# Patient Record
Sex: MALE | Race: WHITE | NOT HISPANIC OR LATINO
[De-identification: names, ages, dates, MRNs, and addresses within clinical notes are randomized per-mention and may not be internally consistent; named-entity substitution may affect disease eponyms.]

---

## 2024-05-31 ENCOUNTER — NON-APPOINTMENT (OUTPATIENT)
Age: 84
End: 2024-05-31

## 2024-06-11 ENCOUNTER — APPOINTMENT (OUTPATIENT)
Dept: ORTHOPEDIC SURGERY | Facility: CLINIC | Age: 84
End: 2024-06-11
Payer: MEDICARE

## 2024-06-11 PROBLEM — Z00.00 ENCOUNTER FOR PREVENTIVE HEALTH EXAMINATION: Status: ACTIVE | Noted: 2024-06-11

## 2024-06-11 PROCEDURE — 99203 OFFICE O/P NEW LOW 30 MIN: CPT

## 2024-06-11 NOTE — PHYSICAL EXAM
[de-identified] : Right knee there is definite global warmth and soft tissue swelling but no effusion there is mild medial joint line tenderness range of motion 0 to 125 degrees mild varus inclination the knee is stable to stress varus valgus stress both full tension and 90 degrees of flexion. [de-identified] : Patient had recent outside radiographs available for review AP standing individual lateral sunrise views were reviewed indicating mild medial joint space narrowing of the medial compartment the right knee on standing AP projection.  Patellofemoral articulation is fairly well-maintained.

## 2024-06-11 NOTE — DISCUSSION/SUMMARY
[de-identified] : Patient I reviewed the radiographs directly he was able to appreciate the mild narrowing of medial joint space on standing AP projection of the right knee compared to the left.  We talked about further conservative measures can be undertaken to help reduce his discomfort patient was given a cortisone injection today and also told to take Tylenol extra strength when needed for pain he should also ice the knee on a regular basis.  Patient will follow-up as needed if pain persist despite these conservative measures.  Today's consultation lasted 35 minutes.

## 2024-06-11 NOTE — PROCEDURE
[de-identified] : [facility] [place] [id number] LOT # [id number] EXP 7/25 1% 500MG/50ML  KENALOG-10 [employer] NDC [id number] LOT # [id number] EXP 12/25 50MG/5ML  Patient with cortisone injection lateral part of the right knee today.  This done in sterile conditions.  Patient tolerated injection well.

## 2024-06-11 NOTE — HISTORY OF PRESENT ILLNESS
[de-identified] : First-time visit for this active 84-year-old gentleman he is here with approximate 9-month history of right medial knee pain.  Patient states he is very active and is use played a lot of sports recalls no specific recent accident injury or initiating traumatic event has been having increasing discomfort in her medial aspect of the right knee especially with stair climbing getting out of seated position.  Patient currently just takes Tylenol for pain.

## 2024-06-11 NOTE — REASON FOR VISIT
[Initial Visit] : an initial visit for [Knee Pain] : knee pain [FreeTextEntry2] : constant dull right knee pain.

## 2024-06-18 ENCOUNTER — APPOINTMENT (OUTPATIENT)
Dept: ORTHOPEDIC SURGERY | Facility: CLINIC | Age: 84
End: 2024-06-18

## 2024-06-18 PROCEDURE — 20610 DRAIN/INJ JOINT/BURSA W/O US: CPT | Mod: RT

## 2024-06-18 PROCEDURE — 99214 OFFICE O/P EST MOD 30 MIN: CPT | Mod: 25

## 2024-06-18 NOTE — DISCUSSION/SUMMARY
[de-identified] : Patient I discussed her options he also minimal improvement with previous cortisone injection we will plan cortisone injection today.  Patient follow-up  No improvement MRI of the knee more than that point.  Today's consultation lasted 30 minutes.

## 2024-06-18 NOTE — HISTORY OF PRESENT ILLNESS
[de-identified] : Patient returns today states he felt only mild improvement with the previous cortisone injection on the previous visit June 11 which is a week ago today.  Patient continues complain of swelling and pain in the right knee on the medial aspect.  Is here for repeat evaluation.

## 2024-06-18 NOTE — PROCEDURE
[de-identified] : [facility] [id number] LOT # [id number] EXP 7/25 1% 500MG/50ML  KENALOG-10 [employer] NDC [id number] LOT # [id number] EXP 12/25 50MG/5ML  Patient with cortisone injection lateral part of the right knee today.  This done in sterile conditions.  Patient tolerated injection well.

## 2024-06-18 NOTE — REASON FOR VISIT
[Follow-Up Visit] : a follow-up visit for [Knee Pain] : knee pain [FreeTextEntry2] : RIGHT KNEE PAIN RECIEVED CORTISONE INJECTION LAST VISIT.

## 2024-06-18 NOTE — PHYSICAL EXAM
[de-identified] : Right knee there is definite global warmth and soft tissue swelling but no effusion there is mild medial joint line tenderness range of motion 0 to 125 degrees mild varus inclination the knee is stable to stress varus valgus stress both full tension and 90 degrees of flexion.

## 2024-06-27 ENCOUNTER — APPOINTMENT (OUTPATIENT)
Dept: ORTHOPEDIC SURGERY | Facility: CLINIC | Age: 84
End: 2024-06-27
Payer: MEDICARE

## 2024-06-27 ENCOUNTER — TRANSCRIPTION ENCOUNTER (OUTPATIENT)
Age: 84
End: 2024-06-27

## 2024-06-27 DIAGNOSIS — M17.11 UNILATERAL PRIMARY OSTEOARTHRITIS, RIGHT KNEE: ICD-10-CM

## 2024-06-27 PROCEDURE — 99214 OFFICE O/P EST MOD 30 MIN: CPT

## 2024-07-01 ENCOUNTER — RX RENEWAL (OUTPATIENT)
Age: 84
End: 2024-07-01

## 2024-10-03 ENCOUNTER — APPOINTMENT (OUTPATIENT)
Dept: ORTHOPEDIC SURGERY | Facility: CLINIC | Age: 84
End: 2024-10-03
Payer: MEDICARE

## 2024-10-03 DIAGNOSIS — M17.11 UNILATERAL PRIMARY OSTEOARTHRITIS, RIGHT KNEE: ICD-10-CM

## 2024-10-03 PROCEDURE — 20610 DRAIN/INJ JOINT/BURSA W/O US: CPT | Mod: RT

## 2024-10-03 NOTE — PHYSICAL EXAM
[de-identified] : Right knee there is definite global warmth and soft tissue swelling but no effusion there is mild medial joint line tenderness range of motion 0 to 125 degrees mild varus inclination the knee is stable to stress varus valgus stress both full tension and 90 degrees of flexion.

## 2024-10-03 NOTE — DISCUSSION/SUMMARY
[de-identified] : Patient will ice the knee here in the office ice again tonight if symptomatic she will follow-up in 2 weeks time if he sees no improvement that point we may consider further options including potential sideration of knee replacement surgery.

## 2024-10-03 NOTE — PROCEDURE
[de-identified] : DUROLANE [employer]OV[employer] NDC [id number] LOT # [id number] EXP 2027/03/31 3ML/60MG  Patient given single dose Durolane injection to the right knee this done in sterile conditions.  Patient tolerated injection well.

## 2024-10-17 ENCOUNTER — APPOINTMENT (OUTPATIENT)
Dept: ORTHOPEDIC SURGERY | Facility: CLINIC | Age: 84
End: 2024-10-17
Payer: MEDICARE

## 2024-10-17 DIAGNOSIS — M17.11 UNILATERAL PRIMARY OSTEOARTHRITIS, RIGHT KNEE: ICD-10-CM

## 2024-10-17 PROCEDURE — 99213 OFFICE O/P EST LOW 20 MIN: CPT
